# Patient Record
Sex: MALE | URBAN - METROPOLITAN AREA
[De-identification: names, ages, dates, MRNs, and addresses within clinical notes are randomized per-mention and may not be internally consistent; named-entity substitution may affect disease eponyms.]

---

## 2023-08-29 ENCOUNTER — OCC HEALTH (OUTPATIENT)
Dept: OCCUPATIONAL MEDICINE | Age: 43
End: 2023-08-29
Attending: PHYSICIAN ASSISTANT

## 2023-08-29 DIAGNOSIS — S46.911A RIGHT SHOULDER STRAIN, INITIAL ENCOUNTER: Primary | ICD-10-CM

## 2023-09-05 ENCOUNTER — OFFICE VISIT (OUTPATIENT)
Dept: OCCUPATIONAL MEDICINE | Age: 43
End: 2023-09-05
Attending: PHYSICIAN ASSISTANT

## 2023-09-05 DIAGNOSIS — M24.811 INTERNAL DERANGEMENT OF SHOULDER, RIGHT: Primary | ICD-10-CM

## 2024-12-19 ENCOUNTER — OFFICE VISIT (OUTPATIENT)
Dept: ORTHOPEDICS CLINIC | Facility: CLINIC | Age: 44
End: 2024-12-19
Payer: COMMERCIAL

## 2024-12-19 DIAGNOSIS — M20.002 FINGER DEFORMITY, LEFT: ICD-10-CM

## 2024-12-19 DIAGNOSIS — S61.217A LACERATION OF LEFT LITTLE FINGER WITHOUT FOREIGN BODY WITHOUT DAMAGE TO NAIL, INITIAL ENCOUNTER: ICD-10-CM

## 2024-12-19 DIAGNOSIS — L08.9 FINGER INFECTION: ICD-10-CM

## 2024-12-19 DIAGNOSIS — M24.842 OTH SPECIFIC JOINT DERANGEMENTS OF LEFT HAND, NEC: Primary | ICD-10-CM

## 2024-12-19 RX ORDER — CEPHALEXIN 500 MG/1
500 CAPSULE ORAL 4 TIMES DAILY
Qty: 28 CAPSULE | Refills: 0 | Status: SHIPPED | OUTPATIENT
Start: 2024-12-19 | End: 2024-12-26

## 2024-12-19 RX ORDER — LISINOPRIL 10 MG/1
TABLET ORAL
COMMUNITY
Start: 2024-12-15

## 2024-12-19 RX ORDER — DOXYCYCLINE 100 MG/1
CAPSULE ORAL
COMMUNITY
Start: 2024-12-15

## 2024-12-19 RX ORDER — ERGOCALCIFEROL 1.25 MG/1
CAPSULE, LIQUID FILLED ORAL
COMMUNITY
Start: 2024-12-15
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
## 2024-12-20 ENCOUNTER — TELEPHONE (OUTPATIENT)
Dept: ORTHOPEDICS CLINIC | Facility: CLINIC | Age: 44
End: 2024-12-20

## 2024-12-20 NOTE — TELEPHONE ENCOUNTER
Spoke with patient to let him know that if his employer is not able to accommodate the work restriction it would be feasible to pursue short term disability.  Patient stated he will get forms from , forms phone number given to patient.  He will call back with any other questions.

## 2024-12-20 NOTE — TELEPHONE ENCOUNTER
Patient was seen in office 12/19 for a left hand injury. He was put on restrictions for work but is a  and is concerned that he will further injure it. Would like to know if Dr. Sandhu would suggest him going on STD. Please advise and reach back out to patient   [phone] (home)

## 2024-12-26 ENCOUNTER — TELEPHONE (OUTPATIENT)
Dept: ORTHOPEDICS CLINIC | Facility: CLINIC | Age: 44
End: 2024-12-26

## 2024-12-26 ENCOUNTER — HOSPITAL ENCOUNTER (OUTPATIENT)
Dept: GENERAL RADIOLOGY | Age: 44
Discharge: HOME | End: 2024-12-26
Attending: FAMILY MEDICINE
Payer: COMMERCIAL

## 2024-12-26 ENCOUNTER — OFFICE VISIT (OUTPATIENT)
Dept: ORTHOPEDICS CLINIC | Facility: CLINIC | Age: 44
End: 2024-12-26
Payer: COMMERCIAL

## 2024-12-26 DIAGNOSIS — L08.9 FINGER INFECTION: ICD-10-CM

## 2024-12-26 DIAGNOSIS — M24.842 OTH SPECIFIC JOINT DERANGEMENTS OF LEFT HAND, NEC: Primary | ICD-10-CM

## 2024-12-26 DIAGNOSIS — S62.92XA LEFT HAND FRACTURE, CLOSED, INITIAL ENCOUNTER: ICD-10-CM

## 2024-12-26 DIAGNOSIS — S61.217A LACERATION OF LEFT LITTLE FINGER WITHOUT FOREIGN BODY WITHOUT DAMAGE TO NAIL, INITIAL ENCOUNTER: ICD-10-CM

## 2024-12-26 DIAGNOSIS — S62.92XA LEFT HAND FRACTURE, CLOSED, INITIAL ENCOUNTER: Primary | ICD-10-CM

## 2024-12-26 DIAGNOSIS — M20.002 FINGER DEFORMITY, LEFT: ICD-10-CM

## 2024-12-26 PROCEDURE — 73130 X-RAY EXAM OF HAND: CPT | Performed by: FAMILY MEDICINE

## 2024-12-26 PROCEDURE — 99214 OFFICE O/P EST MOD 30 MIN: CPT | Performed by: FAMILY MEDICINE

## 2024-12-26 NOTE — TELEPHONE ENCOUNTER
Xray ordered per Ortho protocol, Xray scheduled.  [employer] message sent to patient to arrive 15 - 20 min early to complete imaging.

## 2024-12-30 ENCOUNTER — HOSPITAL ENCOUNTER (OUTPATIENT)
Dept: MRI IMAGING | Facility: HOSPITAL | Age: 44
Discharge: HOME | End: 2024-12-30
Attending: FAMILY MEDICINE
Payer: COMMERCIAL

## 2024-12-30 DIAGNOSIS — M20.002 FINGER DEFORMITY, LEFT: ICD-10-CM

## 2024-12-30 DIAGNOSIS — M24.842 OTH SPECIFIC JOINT DERANGEMENTS OF LEFT HAND, NEC: ICD-10-CM

## 2024-12-30 DIAGNOSIS — S61.217A LACERATION OF LEFT LITTLE FINGER WITHOUT FOREIGN BODY WITHOUT DAMAGE TO NAIL, INITIAL ENCOUNTER: ICD-10-CM

## 2024-12-30 PROCEDURE — 73218 MRI UPPER EXTREMITY W/O DYE: CPT | Performed by: FAMILY MEDICINE

## 2024-12-30 NOTE — PROGRESS NOTES
Sports Medicine Clinic Note    Subjective:    Chief Complaint: Follow-up for right hand injury.    Date of Injury: 11/29/24.    History: 44-year-old male returns for follow-up evaluation of a right-hand injury. The patient reports significant improvement in wound condition with completion of the prescribed antibiotic course. He notes that the wound is drying out nicely without drainage or redness. He denies pain, swelling, or other symptoms suggestive of infection. However, he continues to be unable to bend the PIP joint of the affected finger and is awaiting an MRI to evaluate for possible tendon injury.    Objective:    Right Hand Examination:    Inspection: Wound on the lateral aspect of the right hand appears dry with no erythema, drainage, or signs of infection. Healing edges are well approximated.  Palpation: Mild residual tenderness over the wound. No fluctuance, crepitus, or warmth noted.  Range of Motion: Persistent inability to flex the PIP joint of the affected finger. No significant improvement in joint mobility since the last visit.  Neurovascular: Sensation remains intact to light touch throughout the hand. Capillary refill remains brisk.    Diagnostic Tests:    No new testing.    Assessment:    Healing lateral hand wound, now without clinical signs of infection.  Persistent PIP joint stiffness, concerning for flexor tendon injury.    Plan:    Additional Workup: Await MRI to assess for tendon involvement.  Wound Care: Continue to keep the wound clean and dry, with no further need for topical ointments or dressings.  Therapy: Hand therapy to address potential stiffness and improve range of motion can be considered after MRI results.  Activity Recommendations: Continue to limit use of the affected hand until the MRI is completed and further evaluation determines next steps. Avoid forceful gripping or repetitive movements.    Follow-Up: Tentatively scheduled for follow-up once the MRI is completed to  reassess the injury and adapt the treatment plan as needed.      Jose Alejandro Sandhu DO, [facility]   Primary Care Sports Medicine

## 2025-01-08 ENCOUNTER — OFFICE VISIT (OUTPATIENT)
Facility: CLINIC | Age: 45
End: 2025-01-08
Payer: COMMERCIAL

## 2025-01-08 VITALS — BODY MASS INDEX: 28.63 KG/M2 | WEIGHT: 200 LBS | HEIGHT: 70 IN

## 2025-01-08 DIAGNOSIS — S63.408A RUPTURE OF FLEXOR SHEATH PULLEY OF FINGER: ICD-10-CM

## 2025-01-08 DIAGNOSIS — S62.647A CLOSED NONDISPLACED FRACTURE OF PROXIMAL PHALANX OF LEFT LITTLE FINGER, INITIAL ENCOUNTER: Primary | ICD-10-CM

## 2025-01-08 PROCEDURE — 99214 OFFICE O/P EST MOD 30 MIN: CPT | Performed by: FAMILY MEDICINE

## 2025-01-08 NOTE — PROGRESS NOTES
Sports Medicine Clinic Note    Subjective:    Chief Complaint: Persistent inability to flex the PIP joint of the left 5th finger.    Date of Injury: 11/29/24.    History: 44-year-old male returns for follow-up regarding his left 5th finger injury. He reports no significant changes in symptoms since the last visit. While the wound remains healed and free of infection, he continues to experience an inability to extend the PIP joint with flexion contracture. No new pain, swelling, or other concerning symptoms are noted. MRI findings were reviewed today.    Objective:    Left 5th Finger Examination:    Inspection: Wound on the lateral aspect of the left 5th finger remains dry and well-healed without erythema, drainage, or other signs of infection.  Palpation: Tenderness over the proximal phalanx. No fluctuance, crepitus, or warmth observed.  Range of Motion: Persistent inability to flex the PIP joint. DIP joint motion preserved.  Neurovascular: Sensation intact to light touch. Capillary refill remains brisk.  Special Tests: Passive tendon glide elicits bowstringing of the flexor digitorum tendon over the PIP joint.    Diagnostic Tests:    MRI findings reviewed, demonstrating:  Healing fracture of the proximal phalanx.  Contusion of the middle phalanx without fracture.  Torn A2 pulley with bowstringing of the flexor digitorum tendon.  Soft tissue swelling without abscess.    Assessment:    Healing fracture of the left 5th proximal phalanx.  Torn A2 pulley with associated bowstringing of the flexor digitorum tendon.  Contusion of the middle phalanx.  Persistent PIP joint stiffness secondary to tendon and pulley injury.    Plan:    Additional Workup: None indicated at this time.  Therapy: Referral to hand therapy for structured rehabilitation focused on range of motion, tendon gliding, and reducing stiffness.  Medications: NSAIDs as needed for pain and swelling control.  Bracing: Recommend a custom thermoplastic splint for  controlled tendon mobilization.  Procedures: If fails to improve with OT, surgical consultation for potential pulley reconstruction or tendon repair to restore flexion function and alleviate bowstringing.  Activity Recommendations: Avoid forceful gripping, repetitive hand movements, or activities that strain the affected finger.    Follow-Up: Tentatively scheduled in 4-6 weeks.      Jose Alejandro Sandhu DO, [facility]   Primary Care Sports Medicine

## 2025-01-09 NOTE — TELEPHONE ENCOUNTER
Received Family Medical Leave Act forms from office via email. Valid Release of Information attached. Logged for processing.

## 2025-01-14 NOTE — TELEPHONE ENCOUNTER
Left message to call back received Family Medical Leave Act/disability forms and need to get details from patient.       Type of Leave:   Reason for Leave:  Start date of leave:  End date of leave:  How many flare ups per month/length?:  How many appts per month/length?:   Was Fee and Turnaround info Given?:

## (undated) NOTE — LETTER
Date: 12/19/2024    Patient Name: Carlos Blackburn          To Whom it may concern:    This letter has been written at the patient's request. The above patient was seen at [facility] for treatment of a medical condition.    This patient should be excused from attending work for 12/19/24.    The patient may return to work/school on 12/20/24 with the following limitations light duty no use of left hand until further medical evaluation.        Sincerely,    Jose Alejandro HARRIS, DO
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
